# Patient Record
Sex: MALE | Race: WHITE
[De-identification: names, ages, dates, MRNs, and addresses within clinical notes are randomized per-mention and may not be internally consistent; named-entity substitution may affect disease eponyms.]

---

## 2022-07-06 ENCOUNTER — HOSPITAL ENCOUNTER (EMERGENCY)
Dept: HOSPITAL 75 - ER | Age: 27
Discharge: HOME | End: 2022-07-06
Payer: COMMERCIAL

## 2022-07-06 VITALS — WEIGHT: 315 LBS | BODY MASS INDEX: 39.58 KG/M2 | HEIGHT: 74.8 IN

## 2022-07-06 VITALS — SYSTOLIC BLOOD PRESSURE: 150 MMHG | DIASTOLIC BLOOD PRESSURE: 88 MMHG

## 2022-07-06 DIAGNOSIS — R10.31: Primary | ICD-10-CM

## 2022-07-06 LAB
ALBUMIN SERPL-MCNC: 4.6 GM/DL (ref 3.2–4.5)
ALP SERPL-CCNC: 71 U/L (ref 40–136)
ALT SERPL-CCNC: 55 U/L (ref 0–55)
BASOPHILS # BLD AUTO: 0.1 10^3/UL (ref 0–0.1)
BASOPHILS NFR BLD AUTO: 1 % (ref 0–10)
BILIRUB SERPL-MCNC: 0.6 MG/DL (ref 0.1–1)
BUN/CREAT SERPL: 19
CALCIUM SERPL-MCNC: 9.8 MG/DL (ref 8.5–10.1)
CHLORIDE SERPL-SCNC: 103 MMOL/L (ref 98–107)
CO2 SERPL-SCNC: 22 MMOL/L (ref 21–32)
CREAT SERPL-MCNC: 0.8 MG/DL (ref 0.6–1.3)
EOSINOPHIL # BLD AUTO: 0.3 10^3/UL (ref 0–0.3)
EOSINOPHIL NFR BLD AUTO: 3 % (ref 0–10)
GFR SERPLBLD BASED ON 1.73 SQ M-ARVRAT: 124 ML/MIN
GLUCOSE SERPL-MCNC: 86 MG/DL (ref 70–105)
HCT VFR BLD CALC: 44 % (ref 40–54)
HGB BLD-MCNC: 14 G/DL (ref 13.3–17.7)
LYMPHOCYTES # BLD AUTO: 2.6 10^3/UL (ref 1–4)
LYMPHOCYTES NFR BLD AUTO: 27 % (ref 12–44)
MANUAL DIFFERENTIAL PERFORMED BLD QL: NO
MCH RBC QN AUTO: 29 PG (ref 25–34)
MCHC RBC AUTO-ENTMCNC: 32 G/DL (ref 32–36)
MCV RBC AUTO: 90 FL (ref 80–99)
MONOCYTES # BLD AUTO: 0.6 10^3/UL (ref 0–1)
MONOCYTES NFR BLD AUTO: 6 % (ref 0–12)
NEUTROPHILS # BLD AUTO: 5.9 10^3/UL (ref 1.8–7.8)
NEUTROPHILS NFR BLD AUTO: 63 % (ref 42–75)
PLATELET # BLD: 277 10^3/UL (ref 130–400)
PMV BLD AUTO: 10.3 FL (ref 9–12.2)
POTASSIUM SERPL-SCNC: 4.3 MMOL/L (ref 3.6–5)
PROT SERPL-MCNC: 7.8 GM/DL (ref 6.4–8.2)
SODIUM SERPL-SCNC: 138 MMOL/L (ref 135–145)
WBC # BLD AUTO: 9.4 10^3/UL (ref 4.3–11)

## 2022-07-06 PROCEDURE — 36415 COLL VENOUS BLD VENIPUNCTURE: CPT

## 2022-07-06 PROCEDURE — 74177 CT ABD & PELVIS W/CONTRAST: CPT

## 2022-07-06 PROCEDURE — 85025 COMPLETE CBC W/AUTO DIFF WBC: CPT

## 2022-07-06 PROCEDURE — 86141 C-REACTIVE PROTEIN HS: CPT

## 2022-07-06 PROCEDURE — 80053 COMPREHEN METABOLIC PANEL: CPT

## 2022-07-06 NOTE — DIAGNOSTIC IMAGING REPORT
EXAMINATION: CT abdomen and pelvis with intravenous contrast.



TECHNIQUE: Multiple contiguous axial images were obtained through

the abdomen and pelvis after the uneventful administration of

intravenous contrast. All CT scans use one or more of the

following dose optimizing techniques: Automated exposure control,

MA and/or KvP adjustment based on patient size and exam type or

iterative reconstruction. 



HISTORY: RLQ pain.



COMPARISON: None available.



FINDINGS: 



Lung bases: The lung bases are clear.



Solid organs: The liver is normal without focal lesion. The

gallbladder is normal. There is no biliary ductal dilation.

Pancreas is normal.  Spleen is normal. Adrenal glands are normal.

The kidneys are normal without hydronephrosis.



Bowel: The stomach and small bowel are normal without

obstruction. The colon and appendix are normal. 



Peritoneum: There is no intraperitoneal free fluid or free air.

No suspicious lymphadenopathy. 



Vasculature: Normal without aneurysm.



Musculoskeletal: No suspicious osseous lesion or compression

fracture.



Pelvis: The prostate gland is normal. The urinary bladder is

normal.



IMPRESSION:



1. No acute abnormality in the abdomen or pelvis.



Dictated by: 



  Dictated on workstation # [id number]

## 2022-07-06 NOTE — ED ABDOMINAL PAIN
General


Stated Complaint:  RLQ PAIN


Source of Information:  Patient


Exam Limitations:  No Limitations





History of Present Illness


Date Seen by Provider:  2022


Time Seen by Provider:  14:29


Initial Comments


[facility] 

chief complaint is had about 2 to 3 days of abdominal pain right lower quadrant.

 He had a bowel movement this morning which was normal and did not relieve or 

worsen his pain.  Last time he ate was at 9:00 this morning.  Did not relieve or

worsen his pain.  No nausea vomiting fevers chills rash or sick contacts.  No 

history of abdominal surgeries.  No significant medical history.  The patient 

was awoken from sleep early this morning with the pain.  He rates it as about 4 

out of 10.  He has not taken anything for the pain.





Allergies and Home Medications


Allergies


Coded Allergies:  


     No Known Drug Allergies (Unverified , 22)





Patient Home Medication List


Home Medication List Reviewed:  Yes





Review of Systems


Review of Systems


Constitutional:  No chills, No diaphoresis


EENTM:  No Blurred Vision, No Double Vision


Respiratory:  Denies Cough, Denies Shortness of Air


Cardiovascular:  Denies Chest Pain, Denies Lightheadedness


Gastrointestinal:  Abdominal Pain; Denies Constipated, Denies Diarrhea, Denies 

Nausea


Genitourinary:  Denies Discharge, Denies Drainage


Musculoskeletal:  No back pain, No joint pain


Skin:  No pruritus, No rash


Psychiatric/Neurological:  Denies Headache, Denies Numbness





All Other Systems Reviewed


Negative Unless Noted:  Yes





Past Medical-Social-Family Hx


Patient Social History


Tobacco Use?:  No


Use of E-Cig and/or Vaping dev:  No


Substance use?:  No





Physical Exam


Vital Signs





Vital Signs - First Documented








 22





 14:40


 


Temp 37.1


 


Pulse 83


 


Resp 18


 


B/P (MAP) 167/103 (124)


 


Pulse Ox 100





Capillary Refill :


Height/Weight/BMI


Height: '"


Weight: lbs. oz. kg;  BMI


Method:


General Appearance:  WD/WN, no apparent distress


HEENT:  PERRL/EOMI, pharynx normal


Neck:  full range of motion, normal inspection


Respiratory:  lungs clear, normal breath sounds, no respiratory distress, no 

accessory muscle use


Cardiovascular:  normal peripheral pulses, regular rate, rhythm


Gastrointestinal:  normal bowel sounds, non tender, soft, no organomegaly; No 

rebound; other (Negative for psoas sign, Rovsing sign, McBurney's point 

tenderness.  Some tenderness under the umbilicus just to the right side.)


Extremities:  normal range of motion, non-tender, normal capillary refill


Neurologic/Psychiatric:  alert, normal mood/affect, oriented x 3


Skin:  normal color, warm/dry





Progress/Results/Core Measures


Results/Orders


Lab Results





Laboratory Tests








Test


 22


14:50 Range/Units


 


 


White Blood Count


 9.4 


 4.3-11.0


10^3/uL


 


Red Blood Count


 4.82 


 4.30-5.52


10^6/uL


 


Hemoglobin 14.0  13.3-17.7  g/dL


 


Hematocrit 44  40-54  %


 


Mean Corpuscular Volume 90  80-99  fL


 


Mean Corpuscular Hemoglobin 29  25-34  pg


 


Mean Corpuscular Hemoglobin


Concent 32 


 32-36  g/dL





 


Red Cell Distribution Width 13.5  10.0-14.5  %


 


Platelet Count


 277 


 130-400


10^3/uL


 


Mean Platelet Volume 10.3  9.0-12.2  fL


 


Immature Granulocyte % (Auto) 0   %


 


Neutrophils (%) (Auto) 63  42-75  %


 


Lymphocytes (%) (Auto) 27  12-44  %


 


Monocytes (%) (Auto) 6  0-12  %


 


Eosinophils (%) (Auto) 3  0-10  %


 


Basophils (%) (Auto) 1  0-10  %


 


Neutrophils # (Auto)


 5.9 


 1.8-7.8


10^3/uL


 


Lymphocytes # (Auto)


 2.6 


 1.0-4.0


10^3/uL


 


Monocytes # (Auto)


 0.6 


 0.0-1.0


10^3/uL


 


Eosinophils # (Auto)


 0.3 


 0.0-0.3


10^3/uL


 


Basophils # (Auto)


 0.1 


 0.0-0.1


10^3/uL


 


Immature Granulocyte # (Auto)


 0.0 


 0.0-0.1


10^3/uL


 


Sodium Level 138  135-145  MMOL/L


 


Potassium Level 4.3  3.6-5.0  MMOL/L


 


Chloride Level 103    MMOL/L


 


Carbon Dioxide Level 22  21-32  MMOL/L


 


Anion Gap 13  5-14  MMOL/L


 


Blood Urea Nitrogen 15  7-18  MG/DL


 


Creatinine


 0.80 


 0.60-1.30


MG/DL


 


Estimat Glomerular Filtration


Rate 124 


  





 


BUN/Creatinine Ratio 19   


 


Glucose Level 86    MG/DL


 


Calcium Level 9.8  8.5-10.1  MG/DL


 


Corrected Calcium   8.5-10.1  MG/DL


 


Total Bilirubin 0.6  0.1-1.0  MG/DL


 


Aspartate Amino Transf


(AST/SGOT) 29 


 5-34  U/L





 


Alanine Aminotransferase


(ALT/SGPT) 55 


 0-55  U/L





 


Alkaline Phosphatase 71    U/L


 


C-Reactive Protein High


Sensitivity 0.69 H


 0.00-0.50


MG/DL


 


Total Protein 7.8  6.4-8.2  GM/DL


 


Albumin 4.6 H 3.2-4.5  GM/DL








My Orders





Orders - ALEXANDRA GILLIAM


Cbc With Automated Diff (22 14:37)


Comprehensive Metabolic Panel (22 14:37)


Hs C Reactive Protein (22 14:37)


Ed Iv/Invasive Line Start (22 14:37)


Ct Abd/Pelv W (Appendicitis) (22 14:37)


Iohexol Injection (Omnipaque 350 Mg/Ml 1 (22 15:00)


Received Contrast (Hold Metformin- Contr (22 15:00)


Sodium Chloride Flush (Catheter Flush Sy (22 15:00)


Ns (Ivpb) (Sodium Chloride 0.9% Ivpb Bag (22 15:00)





Medications Given in ED





Current Medications








 Medications  Dose


 Ordered  Sig/Shantanu


 Route  Start Time


 Stop Time Status Last Admin


Dose Admin


 


 Iohexol  100 ml  ONCE  ONCE


 IV  22 15:00


 22 15:01 DC 22 15:10


100 ML


 


 Sodium Chloride  10 ml  AS NEEDED  PRN


 IV  22 15:00


    22 15:10


10 ML


 


 Sodium Chloride  100 ml  ONCE  ONCE


 IV  22 15:00


 22 15:01 DC 22 15:10


80 ML








Vital Signs/I&O











 22





 14:40


 


Temp 37.1


 


Pulse 83


 


Resp 18


 


B/P (MAP) 167/103 (124)


 


Pulse Ox 100











Progress


Progress Note :  


   Time:  14:53


Progress Note


Patient declined anything for pain.  We will go ahead and give him a liter of 

fluids check some labs and get a CT of his abdomen pelvis with IV contrast to 

focus on the appendix.





Diagnostic Imaging





   Diagonstic Imaging:  CT


   Plain Films/CT/US/NM/MRI:  abdomen, pelvis


Comments


                 [facility] [place], [place]





NAME:   JAYLEN CADENA


MED REC#:   [id number]


ACCOUNT#:   [id number]


PT STATUS:   REG ER


:   1995


PHYSICIAN:   ALEXANDRA GILLIAM MD


ADMIT DATE:   22/ER


                                  ***Signed***


Date of Exam:22





CT ABD/PELV W (APPENDICITIS)








EXAMINATION: CT abdomen and pelvis with intravenous contrast.





TECHNIQUE: Multiple contiguous axial images were obtained through


the abdomen and pelvis after the uneventful administration of


intravenous contrast. All CT scans use one or more of the


following dose optimizing techniques: Automated exposure control,


MA and/or KvP adjustment based on patient size and exam type or


iterative reconstruction. 





HISTORY: RLQ pain.





COMPARISON: None available.





FINDINGS: 





Lung bases: The lung bases are clear.





Solid organs: The liver is normal without focal lesion. The


gallbladder is normal. There is no biliary ductal dilation.


Pancreas is normal.  Spleen is normal. Adrenal glands are normal.


The kidneys are normal without hydronephrosis.





Bowel: The stomach and small bowel are normal without


obstruction. The colon and appendix are normal. 





Peritoneum: There is no intraperitoneal free fluid or free air.


No suspicious lymphadenopathy. 





Vasculature: Normal without aneurysm.





Musculoskeletal: No suspicious osseous lesion or compression


fracture.





Pelvis: The prostate gland is normal. The urinary bladder is


normal.





IMPRESSION:





1. No acute abnormality in the abdomen or pelvis.





Dictated by: 





  Dictated on workstation # [id number]








Dict:   22 1514


Trans:   22 1632


 [id number]





Interpreted by:     LUZ CUETO DO


Electronically signed by: LUZ CUETO DO 22 1632


   Reviewed:  Reviewed by Me





Departure


Impression





   Primary Impression:  


   Abdominal pain


   Qualified Codes:  R10.31 - Right lower quadrant pain


Disposition:  01 HOME, SELF-CARE


Condition:  Stable





Departure-Patient Inst.


Decision time for Depature:  16:56


Referrals:  


FRANTZ MATT MD (PCP/Family)


Primary Care Physician


Patient Instructions:  Stomach Ache and Stomach Upset





Add. Discharge Instructions:  


Drink plenty of fluids.  If you get nauseated you can use Zofran 1 tablet every 

6 hours.


If you have severe intractable pain that does not respond to Tylenol, ibuprofen 

or Tums then you may return to the nearest ER for further evaluation. 


If your symptoms are mild but persistent then you can follow-up with your 

primary care doctor for reevaluation next week.


Scripts


Ondansetron (Ondansetron Odt) 4 Mg Tab.rapdis


4 MG PO Q6H PRN for NAUSEA/VOMITING, #8 TAB 0 Refills


   Prov: ALEXANDRA GILLIAM         22











ALEXANDRA GILLIAM                  2022 14:45

## 2022-10-26 ENCOUNTER — HOSPITAL ENCOUNTER (OUTPATIENT)
Dept: HOSPITAL 75 - CARD | Age: 27
End: 2022-10-26
Attending: PHYSICIAN ASSISTANT
Payer: COMMERCIAL

## 2022-10-26 VITALS — SYSTOLIC BLOOD PRESSURE: 140 MMHG | DIASTOLIC BLOOD PRESSURE: 67 MMHG

## 2022-10-26 DIAGNOSIS — R07.9: Primary | ICD-10-CM

## 2022-10-26 PROCEDURE — 93017 CV STRESS TEST TRACING ONLY: CPT

## 2022-10-27 NOTE — CARDIOLOGY STRESS TEST REPORT
Stress Test Report


Date of Procedure/Referring:


Date of Procedure:  Oct 26, 2022


PCP


Myriam Alonso MD


Admitting Physician


Admitting Physician:


 








Attending Physician:


Mackenzie Cabrera





Indications:


CP





Baseline Heart Rate:


81





Baseline Blood Pressure:


Blood Pressure Systolic:  140


Blood Pressure Diastolic:  67





Baseline EKG:


Baseline EKG:  NSR





Summary/Conclusion:


Summary:


In summary, the patient started exercising with a baseline heart rate, blood


pressure and EKG mentioned above





Patient was able to exercise for a total of 6.30 minutes on Phani protocol,  

METs 7.9


Maximum heart rate 167      


Maximum blood pressure 212/44


   


Stress EKG, Minimal nondiagnostic changes 


Recovery EKG   , Return to baseline 





Conclusion:


1.  Good exercise tolerance for a total of minutes on 6.30 Phani protocol, 7.9 

METs, achieving 86 percent of maximum expected heart rate


2.  Minimal nondiagnostic EKG changes with exercise returned to baseline during 

recovery


3.  No arrhythmia was noted





Copy


Copies To 1:   ABHI KRUSE BASHAR J MD              Oct 27, 2022 08:56

## 2022-12-15 ENCOUNTER — HOSPITAL ENCOUNTER (OUTPATIENT)
Dept: HOSPITAL 75 - CARD | Age: 27
End: 2022-12-15
Attending: INTERNAL MEDICINE
Payer: COMMERCIAL

## 2022-12-15 DIAGNOSIS — I25.10: ICD-10-CM

## 2022-12-15 DIAGNOSIS — I11.9: Primary | ICD-10-CM

## 2022-12-15 PROCEDURE — 93306 TTE W/DOPPLER COMPLETE: CPT
